# Patient Record
Sex: FEMALE | Race: ASIAN | NOT HISPANIC OR LATINO | URBAN - METROPOLITAN AREA
[De-identification: names, ages, dates, MRNs, and addresses within clinical notes are randomized per-mention and may not be internally consistent; named-entity substitution may affect disease eponyms.]

---

## 2024-03-21 ENCOUNTER — OV NP (OUTPATIENT)
Dept: URBAN - METROPOLITAN AREA CLINIC 111 | Facility: CLINIC | Age: 39
End: 2024-03-21
Payer: COMMERCIAL

## 2024-03-21 VITALS
SYSTOLIC BLOOD PRESSURE: 111 MMHG | BODY MASS INDEX: 35.52 KG/M2 | DIASTOLIC BLOOD PRESSURE: 70 MMHG | HEIGHT: 66 IN | WEIGHT: 221 LBS | HEART RATE: 85 BPM | TEMPERATURE: 98.1 F

## 2024-03-21 DIAGNOSIS — R10.9 LEFT SIDED ABDOMINAL PAIN: ICD-10-CM

## 2024-03-21 DIAGNOSIS — K59.09 CHRONIC CONSTIPATION: ICD-10-CM

## 2024-03-21 PROCEDURE — 99243 OFF/OP CNSLTJ NEW/EST LOW 30: CPT | Performed by: STUDENT IN AN ORGANIZED HEALTH CARE EDUCATION/TRAINING PROGRAM

## 2024-03-21 RX ORDER — ALBUTEROL SULFATE 90 UG/1
AEROSOL, METERED RESPIRATORY (INHALATION)
Qty: 0 | Refills: 0 | Status: ACTIVE | COMMUNITY
Start: 1900-01-01

## 2024-03-21 RX ORDER — LINACLOTIDE 72 UG/1
1 CAPSULE AT LEAST 30 MINUTES BEFORE THE FIRST MEAL OF THE DAY ON AN EMPTY STOMACH CAPSULE, GELATIN COATED ORAL ONCE A DAY
Qty: 30 | Refills: 11 | OUTPATIENT
Start: 2024-03-21 | End: 2025-03-16

## 2024-03-21 RX ORDER — MONTELUKAST SODIUM 10 MG/1
TAKE 1 TABLET (10 MG) BY ORAL ROUTE ONCE DAILY IN THE EVENING TABLET, FILM COATED ORAL 1
Qty: 0 | Refills: 0 | Status: ACTIVE | COMMUNITY
Start: 1900-01-01

## 2024-03-21 NOTE — HPI-TODAY'S VISIT:
This patient was referred by Dr. Mclain for evaluation of abdominal pain. A copy of this note will be sent to the referring provider. 37 yo F here for eval of abd pain. L sided abd pain for last 2 years. Previously was intermittnet, now is more constant dull pain.  Sometimes worsens but she is not sure what triggers. LUQ and LLQ, radiates to the back. Also will go down her back to hre pelvis. Has tried gas-x without relief  Has BMs that are small pieces of stool. Also thinner stool.  Says she doesnt feel cleaned out after a BM.  She has tried miralax without relief. CT abd 2/24/24  reviewed -- 2 fibroids, no acute process - there is no mention of fibroid size or stool burden

## 2024-05-14 ENCOUNTER — OFFICE VISIT (OUTPATIENT)
Dept: URBAN - METROPOLITAN AREA CLINIC 50 | Facility: CLINIC | Age: 39
End: 2024-05-14

## 2024-06-13 ENCOUNTER — DASHBOARD ENCOUNTERS (OUTPATIENT)
Age: 39
End: 2024-06-13

## 2024-06-13 ENCOUNTER — OFFICE VISIT (OUTPATIENT)
Dept: URBAN - METROPOLITAN AREA CLINIC 111 | Facility: CLINIC | Age: 39
End: 2024-06-13
Payer: COMMERCIAL

## 2024-06-13 VITALS
HEART RATE: 83 BPM | DIASTOLIC BLOOD PRESSURE: 76 MMHG | WEIGHT: 214 LBS | SYSTOLIC BLOOD PRESSURE: 124 MMHG | BODY MASS INDEX: 34.39 KG/M2 | TEMPERATURE: 98.1 F | HEIGHT: 66 IN

## 2024-06-13 DIAGNOSIS — K59.09 CHRONIC CONSTIPATION: ICD-10-CM

## 2024-06-13 DIAGNOSIS — R10.9 LEFT SIDED ABDOMINAL PAIN: ICD-10-CM

## 2024-06-13 PROCEDURE — 99213 OFFICE O/P EST LOW 20 MIN: CPT | Performed by: STUDENT IN AN ORGANIZED HEALTH CARE EDUCATION/TRAINING PROGRAM

## 2024-06-13 RX ORDER — ALBUTEROL SULFATE 90 UG/1
AEROSOL, METERED RESPIRATORY (INHALATION)
Qty: 0 | Refills: 0 | Status: ACTIVE | COMMUNITY
Start: 1900-01-01

## 2024-06-13 RX ORDER — LINACLOTIDE 72 UG/1
1 CAPSULE AT LEAST 30 MINUTES BEFORE THE FIRST MEAL OF THE DAY ON AN EMPTY STOMACH CAPSULE, GELATIN COATED ORAL ONCE A DAY
Qty: 30 | Refills: 11 | Status: ACTIVE | COMMUNITY
Start: 2024-03-21 | End: 2025-03-16

## 2024-06-13 RX ORDER — MONTELUKAST SODIUM 10 MG/1
TAKE 1 TABLET (10 MG) BY ORAL ROUTE ONCE DAILY IN THE EVENING TABLET, FILM COATED ORAL 1
Qty: 0 | Refills: 0 | Status: ACTIVE | COMMUNITY
Start: 1900-01-01

## 2024-06-13 NOTE — HPI-TODAY'S VISIT:
38 yo F here for follow up. Last seen in January for LLQ/LUQ abd pain, radiating down to pelvis. Suspicion was that this was cused by constipation. DId not have relief with miralax, started on low dose linzess. CT scan with 2 fibroids though size not mentioned in report. Since starting Linzess 72mcg she has been doing really well. Bowel movements more regular and pain resolved Says it was very strong at first but then her body equilibrated to it and it was not as severe response. DId it for a period of time and stopped takin git 2-3 weeks ago because she felt well overall She is asking about more natural ways to prevent constipation

## 2024-06-13 NOTE — HPI-OTHER HISTORIES
Last visit January 2024: 39 yo F here for eval of abd pain. L sided abd pain for last 2 years. Previously was intermittnet, now is more constant dull pain.  Sometimes worsens but she is not sure what triggers. LUQ and LLQ, radiates to the back. Also will go down her back to hre pelvis. Has tried gas-x without relief  Has BMs that are small pieces of stool. Also thinner stool.  Says she doesnt feel cleaned out after a BM.  She has tried miralax without relief. CT abd 2/24/24  reviewed -- 2 fibroids, no acute process - there is no mention of fibroid size or stool burden